# Patient Record
Sex: MALE | Race: OTHER | NOT HISPANIC OR LATINO | URBAN - METROPOLITAN AREA
[De-identification: names, ages, dates, MRNs, and addresses within clinical notes are randomized per-mention and may not be internally consistent; named-entity substitution may affect disease eponyms.]

---

## 2017-06-11 ENCOUNTER — EMERGENCY (EMERGENCY)
Facility: HOSPITAL | Age: 47
LOS: 1 days | Discharge: HOME | End: 2017-06-11
Attending: EMERGENCY MEDICINE | Admitting: EMERGENCY MEDICINE
Payer: MEDICAID

## 2017-06-11 VITALS
RESPIRATION RATE: 18 BRPM | HEIGHT: 66 IN | SYSTOLIC BLOOD PRESSURE: 108 MMHG | DIASTOLIC BLOOD PRESSURE: 69 MMHG | OXYGEN SATURATION: 98 % | HEART RATE: 120 BPM | WEIGHT: 199.96 LBS | TEMPERATURE: 99 F

## 2017-06-11 DIAGNOSIS — M25.522 PAIN IN LEFT ELBOW: ICD-10-CM

## 2017-06-11 DIAGNOSIS — S50.02XA CONTUSION OF LEFT ELBOW, INITIAL ENCOUNTER: ICD-10-CM

## 2017-06-11 DIAGNOSIS — I10 ESSENTIAL (PRIMARY) HYPERTENSION: ICD-10-CM

## 2017-06-11 DIAGNOSIS — F17.200 NICOTINE DEPENDENCE, UNSPECIFIED, UNCOMPLICATED: ICD-10-CM

## 2017-06-11 DIAGNOSIS — E11.9 TYPE 2 DIABETES MELLITUS WITHOUT COMPLICATIONS: ICD-10-CM

## 2017-06-11 PROCEDURE — 99283 EMERGENCY DEPT VISIT LOW MDM: CPT | Mod: 25

## 2017-06-11 RX ORDER — BACITRACIN ZINC 500 UNIT/G
1 OINTMENT IN PACKET (EA) TOPICAL ONCE
Qty: 0 | Refills: 0 | Status: COMPLETED | OUTPATIENT
Start: 2017-06-11 | End: 2017-06-11

## 2017-06-11 RX ORDER — TRAMADOL HYDROCHLORIDE 50 MG/1
25 TABLET ORAL ONCE
Qty: 0 | Refills: 0 | Status: DISCONTINUED | OUTPATIENT
Start: 2017-06-11 | End: 2017-06-11

## 2017-06-11 RX ADMIN — TRAMADOL HYDROCHLORIDE 25 MILLIGRAM(S): 50 TABLET ORAL at 06:33

## 2017-06-11 RX ADMIN — Medication 1 APPLICATION(S): at 06:33
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
## 2017-06-11 NOTE — ED PROVIDER NOTE - MUSCULOSKELETAL, MLM
Spine appears normal, L elbow with abrasion and minimal TTP, no ecchymosis, crepitus, deformity, laxity or edema, FROM, NV intact
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
## 2022-03-08 NOTE — ED ADULT TRIAGE NOTE - AS O2 DELIVERY
10-20-57    Patient has an appt today for labs, but there are no orders in.  Please review and enter the lab orders needed  
Lab ordered  
room air

## 2022-10-17 ENCOUNTER — INPATIENT (INPATIENT)
Facility: HOSPITAL | Age: 52
LOS: 0 days | Discharge: HOME | DRG: 440 | End: 2022-10-17
Attending: STUDENT IN AN ORGANIZED HEALTH CARE EDUCATION/TRAINING PROGRAM | Admitting: EMERGENCY MEDICINE
Payer: COMMERCIAL

## 2022-10-17 VITALS
TEMPERATURE: 99 F | HEART RATE: 73 BPM | OXYGEN SATURATION: 94 % | SYSTOLIC BLOOD PRESSURE: 160 MMHG | DIASTOLIC BLOOD PRESSURE: 94 MMHG | WEIGHT: 145.06 LBS | HEIGHT: 66 IN | RESPIRATION RATE: 15 BRPM

## 2022-10-17 VITALS — HEART RATE: 78 BPM

## 2022-10-17 PROBLEM — E11.9 TYPE 2 DIABETES MELLITUS WITHOUT COMPLICATIONS: Chronic | Status: ACTIVE | Noted: 2017-06-11

## 2022-10-17 PROBLEM — I10 ESSENTIAL (PRIMARY) HYPERTENSION: Chronic | Status: ACTIVE | Noted: 2017-06-11

## 2022-10-17 PROBLEM — J45.909 UNSPECIFIED ASTHMA, UNCOMPLICATED: Chronic | Status: ACTIVE | Noted: 2017-06-11

## 2022-10-17 LAB
ALBUMIN SERPL ELPH-MCNC: 3.8 G/DL — SIGNIFICANT CHANGE UP (ref 3.4–5)
ALP SERPL-CCNC: 139 U/L — HIGH (ref 40–120)
ALT FLD-CCNC: 92 U/L — HIGH (ref 12–42)
ANION GAP SERPL CALC-SCNC: 4 MMOL/L — LOW (ref 9–16)
APPEARANCE UR: CLEAR — SIGNIFICANT CHANGE UP
AST SERPL-CCNC: 51 U/L — HIGH (ref 15–37)
BASOPHILS # BLD AUTO: 0.04 K/UL — SIGNIFICANT CHANGE UP (ref 0–0.2)
BASOPHILS NFR BLD AUTO: 0.3 % — SIGNIFICANT CHANGE UP (ref 0–2)
BILIRUB DIRECT SERPL-MCNC: 1.3 MG/DL — HIGH (ref 0–0.3)
BILIRUB DIRECT SERPL-MCNC: SIGNIFICANT CHANGE UP MG/DL (ref 0–0.3)
BILIRUB SERPL-MCNC: 2.8 MG/DL — HIGH (ref 0.2–1.2)
BILIRUB UR-MCNC: NEGATIVE — SIGNIFICANT CHANGE UP
BUN SERPL-MCNC: 9 MG/DL — SIGNIFICANT CHANGE UP (ref 7–23)
CALCIUM SERPL-MCNC: 9.4 MG/DL — SIGNIFICANT CHANGE UP (ref 8.5–10.5)
CHLORIDE SERPL-SCNC: 101 MMOL/L — SIGNIFICANT CHANGE UP (ref 96–108)
CO2 SERPL-SCNC: 31 MMOL/L — SIGNIFICANT CHANGE UP (ref 22–31)
COLOR SPEC: YELLOW — SIGNIFICANT CHANGE UP
CREAT SERPL-MCNC: 0.87 MG/DL — SIGNIFICANT CHANGE UP (ref 0.5–1.3)
DIFF PNL FLD: NEGATIVE — SIGNIFICANT CHANGE UP
EGFR: 104 ML/MIN/1.73M2 — SIGNIFICANT CHANGE UP
EOSINOPHIL # BLD AUTO: 1.21 K/UL — HIGH (ref 0–0.5)
EOSINOPHIL NFR BLD AUTO: 8.1 % — HIGH (ref 0–6)
ETHANOL SERPL-MCNC: <3 MG/DL — SIGNIFICANT CHANGE UP
GLUCOSE SERPL-MCNC: 346 MG/DL — HIGH (ref 70–99)
GLUCOSE UR QL: >=1000
HCT VFR BLD CALC: 40.2 % — SIGNIFICANT CHANGE UP (ref 39–50)
HGB BLD-MCNC: 13.5 G/DL — SIGNIFICANT CHANGE UP (ref 13–17)
IMM GRANULOCYTES NFR BLD AUTO: 0.4 % — SIGNIFICANT CHANGE UP (ref 0–0.9)
KETONES UR-MCNC: NEGATIVE — SIGNIFICANT CHANGE UP
LEUKOCYTE ESTERASE UR-ACNC: NEGATIVE — SIGNIFICANT CHANGE UP
LIDOCAIN IGE QN: >1500 U/L — HIGH (ref 73–393)
LYMPHOCYTES # BLD AUTO: 14.5 % — SIGNIFICANT CHANGE UP (ref 13–44)
LYMPHOCYTES # BLD AUTO: 2.18 K/UL — SIGNIFICANT CHANGE UP (ref 1–3.3)
MAGNESIUM SERPL-MCNC: 1.7 MG/DL — SIGNIFICANT CHANGE UP (ref 1.6–2.6)
MCHC RBC-ENTMCNC: 31.8 PG — SIGNIFICANT CHANGE UP (ref 27–34)
MCHC RBC-ENTMCNC: 33.6 GM/DL — SIGNIFICANT CHANGE UP (ref 32–36)
MCV RBC AUTO: 94.8 FL — SIGNIFICANT CHANGE UP (ref 80–100)
MONOCYTES # BLD AUTO: 0.79 K/UL — SIGNIFICANT CHANGE UP (ref 0–0.9)
MONOCYTES NFR BLD AUTO: 5.3 % — SIGNIFICANT CHANGE UP (ref 2–14)
NEUTROPHILS # BLD AUTO: 10.71 K/UL — HIGH (ref 1.8–7.4)
NEUTROPHILS NFR BLD AUTO: 71.4 % — SIGNIFICANT CHANGE UP (ref 43–77)
NITRITE UR-MCNC: NEGATIVE — SIGNIFICANT CHANGE UP
NRBC # BLD: 0 /100 WBCS — SIGNIFICANT CHANGE UP (ref 0–0)
PCP SPEC-MCNC: SIGNIFICANT CHANGE UP
PH UR: 7 — SIGNIFICANT CHANGE UP (ref 5–8)
PLATELET # BLD AUTO: 336 K/UL — SIGNIFICANT CHANGE UP (ref 150–400)
POTASSIUM SERPL-MCNC: 4.1 MMOL/L — SIGNIFICANT CHANGE UP (ref 3.5–5.3)
POTASSIUM SERPL-SCNC: 4.1 MMOL/L — SIGNIFICANT CHANGE UP (ref 3.5–5.3)
PROT SERPL-MCNC: 8.4 G/DL — HIGH (ref 6.4–8.2)
PROT UR-MCNC: NEGATIVE MG/DL — SIGNIFICANT CHANGE UP
RBC # BLD: 4.24 M/UL — SIGNIFICANT CHANGE UP (ref 4.2–5.8)
RBC # FLD: 13.4 % — SIGNIFICANT CHANGE UP (ref 10.3–14.5)
SARS-COV-2 RNA SPEC QL NAA+PROBE: SIGNIFICANT CHANGE UP
SODIUM SERPL-SCNC: 136 MMOL/L — SIGNIFICANT CHANGE UP (ref 132–145)
SP GR SPEC: 1.01 — SIGNIFICANT CHANGE UP (ref 1–1.03)
UROBILINOGEN FLD QL: 0.2 E.U./DL — SIGNIFICANT CHANGE UP
WBC # BLD: 14.99 K/UL — HIGH (ref 3.8–10.5)
WBC # FLD AUTO: 14.99 K/UL — HIGH (ref 3.8–10.5)

## 2022-10-17 PROCEDURE — 81003 URINALYSIS AUTO W/O SCOPE: CPT

## 2022-10-17 PROCEDURE — 99223 1ST HOSP IP/OBS HIGH 75: CPT

## 2022-10-17 PROCEDURE — 99285 EMERGENCY DEPT VISIT HI MDM: CPT

## 2022-10-17 PROCEDURE — 36415 COLL VENOUS BLD VENIPUNCTURE: CPT

## 2022-10-17 PROCEDURE — 74177 CT ABD & PELVIS W/CONTRAST: CPT | Mod: 26

## 2022-10-17 PROCEDURE — 80053 COMPREHEN METABOLIC PANEL: CPT

## 2022-10-17 PROCEDURE — 83735 ASSAY OF MAGNESIUM: CPT

## 2022-10-17 PROCEDURE — 93010 ELECTROCARDIOGRAM REPORT: CPT

## 2022-10-17 PROCEDURE — 93005 ELECTROCARDIOGRAM TRACING: CPT

## 2022-10-17 PROCEDURE — 80307 DRUG TEST PRSMV CHEM ANLYZR: CPT

## 2022-10-17 PROCEDURE — 85025 COMPLETE CBC W/AUTO DIFF WBC: CPT

## 2022-10-17 PROCEDURE — 99285 EMERGENCY DEPT VISIT HI MDM: CPT | Mod: 25

## 2022-10-17 PROCEDURE — 74177 CT ABD & PELVIS W/CONTRAST: CPT

## 2022-10-17 PROCEDURE — 96374 THER/PROPH/DIAG INJ IV PUSH: CPT

## 2022-10-17 PROCEDURE — 96375 TX/PRO/DX INJ NEW DRUG ADDON: CPT

## 2022-10-17 PROCEDURE — 82248 BILIRUBIN DIRECT: CPT

## 2022-10-17 PROCEDURE — 87635 SARS-COV-2 COVID-19 AMP PRB: CPT

## 2022-10-17 PROCEDURE — 83690 ASSAY OF LIPASE: CPT

## 2022-10-17 RX ORDER — DEXTROSE 50 % IN WATER 50 %
25 SYRINGE (ML) INTRAVENOUS ONCE
Refills: 0 | Status: DISCONTINUED | OUTPATIENT
Start: 2022-10-17 | End: 2022-10-17

## 2022-10-17 RX ORDER — SODIUM CHLORIDE 9 MG/ML
1000 INJECTION INTRAMUSCULAR; INTRAVENOUS; SUBCUTANEOUS ONCE
Refills: 0 | Status: COMPLETED | OUTPATIENT
Start: 2022-10-17 | End: 2022-10-17

## 2022-10-17 RX ORDER — INSULIN LISPRO 100/ML
VIAL (ML) SUBCUTANEOUS
Refills: 0 | Status: DISCONTINUED | OUTPATIENT
Start: 2022-10-17 | End: 2022-10-17

## 2022-10-17 RX ORDER — ACETAMINOPHEN 500 MG
1000 TABLET ORAL ONCE
Refills: 0 | Status: COMPLETED | OUTPATIENT
Start: 2022-10-17 | End: 2022-10-17

## 2022-10-17 RX ORDER — DEXTROSE 50 % IN WATER 50 %
15 SYRINGE (ML) INTRAVENOUS ONCE
Refills: 0 | Status: DISCONTINUED | OUTPATIENT
Start: 2022-10-17 | End: 2022-10-17

## 2022-10-17 RX ORDER — METRONIDAZOLE 500 MG
500 TABLET ORAL EVERY 8 HOURS
Refills: 0 | Status: DISCONTINUED | OUTPATIENT
Start: 2022-10-17 | End: 2022-10-17

## 2022-10-17 RX ORDER — HYDROMORPHONE HYDROCHLORIDE 2 MG/ML
1 INJECTION INTRAMUSCULAR; INTRAVENOUS; SUBCUTANEOUS ONCE
Refills: 0 | Status: DISCONTINUED | OUTPATIENT
Start: 2022-10-17 | End: 2022-10-17

## 2022-10-17 RX ORDER — METRONIDAZOLE 500 MG
500 TABLET ORAL ONCE
Refills: 0 | Status: COMPLETED | OUTPATIENT
Start: 2022-10-17 | End: 2022-10-17

## 2022-10-17 RX ORDER — SODIUM CHLORIDE 9 MG/ML
1000 INJECTION, SOLUTION INTRAVENOUS
Refills: 0 | Status: DISCONTINUED | OUTPATIENT
Start: 2022-10-17 | End: 2022-10-17

## 2022-10-17 RX ORDER — ONDANSETRON 8 MG/1
4 TABLET, FILM COATED ORAL EVERY 6 HOURS
Refills: 0 | Status: DISCONTINUED | OUTPATIENT
Start: 2022-10-17 | End: 2022-10-17

## 2022-10-17 RX ORDER — MORPHINE SULFATE 50 MG/1
4 CAPSULE, EXTENDED RELEASE ORAL ONCE
Refills: 0 | Status: DISCONTINUED | OUTPATIENT
Start: 2022-10-17 | End: 2022-10-17

## 2022-10-17 RX ORDER — GLUCAGON INJECTION, SOLUTION 0.5 MG/.1ML
1 INJECTION, SOLUTION SUBCUTANEOUS ONCE
Refills: 0 | Status: DISCONTINUED | OUTPATIENT
Start: 2022-10-17 | End: 2022-10-17

## 2022-10-17 RX ORDER — DEXTROSE 50 % IN WATER 50 %
12.5 SYRINGE (ML) INTRAVENOUS ONCE
Refills: 0 | Status: DISCONTINUED | OUTPATIENT
Start: 2022-10-17 | End: 2022-10-17

## 2022-10-17 RX ORDER — CEFTRIAXONE 500 MG/1
1000 INJECTION, POWDER, FOR SOLUTION INTRAMUSCULAR; INTRAVENOUS EVERY 24 HOURS
Refills: 0 | Status: DISCONTINUED | OUTPATIENT
Start: 2022-10-17 | End: 2022-10-17

## 2022-10-17 RX ORDER — HEPARIN SODIUM 5000 [USP'U]/ML
5000 INJECTION INTRAVENOUS; SUBCUTANEOUS EVERY 8 HOURS
Refills: 0 | Status: DISCONTINUED | OUTPATIENT
Start: 2022-10-17 | End: 2022-10-17

## 2022-10-17 RX ORDER — IPRATROPIUM/ALBUTEROL SULFATE 18-103MCG
3 AEROSOL WITH ADAPTER (GRAM) INHALATION EVERY 6 HOURS
Refills: 0 | Status: DISCONTINUED | OUTPATIENT
Start: 2022-10-17 | End: 2022-10-17

## 2022-10-17 RX ORDER — PIPERACILLIN AND TAZOBACTAM 4; .5 G/20ML; G/20ML
3.38 INJECTION, POWDER, LYOPHILIZED, FOR SOLUTION INTRAVENOUS ONCE
Refills: 0 | Status: COMPLETED | OUTPATIENT
Start: 2022-10-17 | End: 2022-10-17

## 2022-10-17 RX ORDER — METOCLOPRAMIDE HCL 10 MG
10 TABLET ORAL ONCE
Refills: 0 | Status: COMPLETED | OUTPATIENT
Start: 2022-10-17 | End: 2022-10-17

## 2022-10-17 RX ORDER — HYDROMORPHONE HYDROCHLORIDE 2 MG/ML
0.5 INJECTION INTRAMUSCULAR; INTRAVENOUS; SUBCUTANEOUS EVERY 4 HOURS
Refills: 0 | Status: DISCONTINUED | OUTPATIENT
Start: 2022-10-17 | End: 2022-10-17

## 2022-10-17 RX ORDER — ONDANSETRON 8 MG/1
4 TABLET, FILM COATED ORAL ONCE
Refills: 0 | Status: COMPLETED | OUTPATIENT
Start: 2022-10-17 | End: 2022-10-17

## 2022-10-17 RX ORDER — METRONIDAZOLE 500 MG
TABLET ORAL
Refills: 0 | Status: DISCONTINUED | OUTPATIENT
Start: 2022-10-17 | End: 2022-10-17

## 2022-10-17 RX ADMIN — HYDROMORPHONE HYDROCHLORIDE 0.5 MILLIGRAM(S): 2 INJECTION INTRAMUSCULAR; INTRAVENOUS; SUBCUTANEOUS at 15:45

## 2022-10-17 RX ADMIN — Medication 1000 MILLIGRAM(S): at 12:00

## 2022-10-17 RX ADMIN — Medication 400 MILLIGRAM(S): at 11:24

## 2022-10-17 RX ADMIN — HYDROMORPHONE HYDROCHLORIDE 0.5 MILLIGRAM(S): 2 INJECTION INTRAMUSCULAR; INTRAVENOUS; SUBCUTANEOUS at 14:59

## 2022-10-17 RX ADMIN — Medication 100 MILLIGRAM(S): at 14:24

## 2022-10-17 RX ADMIN — SODIUM CHLORIDE 1000 MILLILITER(S): 9 INJECTION INTRAMUSCULAR; INTRAVENOUS; SUBCUTANEOUS at 03:57

## 2022-10-17 RX ADMIN — MORPHINE SULFATE 4 MILLIGRAM(S): 50 CAPSULE, EXTENDED RELEASE ORAL at 04:41

## 2022-10-17 RX ADMIN — CEFTRIAXONE 100 MILLIGRAM(S): 500 INJECTION, POWDER, FOR SOLUTION INTRAMUSCULAR; INTRAVENOUS at 12:46

## 2022-10-17 RX ADMIN — HYDROMORPHONE HYDROCHLORIDE 1 MILLIGRAM(S): 2 INJECTION INTRAMUSCULAR; INTRAVENOUS; SUBCUTANEOUS at 06:24

## 2022-10-17 RX ADMIN — Medication 10 MILLIGRAM(S): at 06:24

## 2022-10-17 RX ADMIN — SODIUM CHLORIDE 1000 MILLILITER(S): 9 INJECTION INTRAMUSCULAR; INTRAVENOUS; SUBCUTANEOUS at 06:59

## 2022-10-17 RX ADMIN — HYDROMORPHONE HYDROCHLORIDE 1 MILLIGRAM(S): 2 INJECTION INTRAMUSCULAR; INTRAVENOUS; SUBCUTANEOUS at 06:59

## 2022-10-17 RX ADMIN — SODIUM CHLORIDE 150 MILLILITER(S): 9 INJECTION, SOLUTION INTRAVENOUS at 07:25

## 2022-10-17 RX ADMIN — PIPERACILLIN AND TAZOBACTAM 3.38 GRAM(S): 4; .5 INJECTION, POWDER, LYOPHILIZED, FOR SOLUTION INTRAVENOUS at 07:00

## 2022-10-17 RX ADMIN — PIPERACILLIN AND TAZOBACTAM 200 GRAM(S): 4; .5 INJECTION, POWDER, LYOPHILIZED, FOR SOLUTION INTRAVENOUS at 06:24

## 2022-10-17 RX ADMIN — ONDANSETRON 4 MILLIGRAM(S): 8 TABLET, FILM COATED ORAL at 04:42

## 2022-10-17 RX ADMIN — MORPHINE SULFATE 4 MILLIGRAM(S): 50 CAPSULE, EXTENDED RELEASE ORAL at 07:37

## 2022-10-17 RX ADMIN — MORPHINE SULFATE 4 MILLIGRAM(S): 50 CAPSULE, EXTENDED RELEASE ORAL at 06:59

## 2022-10-17 RX ADMIN — HEPARIN SODIUM 5000 UNIT(S): 5000 INJECTION INTRAVENOUS; SUBCUTANEOUS at 14:59

## 2022-10-17 NOTE — ED PROVIDER NOTE - OBJECTIVE STATEMENT
53 yo M with PMHx of DM and asthma, no h/o intubation, and HTN, not vaccinated for COVID, BIBA for abdominal pain and N/V x 1 wk. Pt reports intermittent nausea and NBNB emesis x 1 wk with mid abd pain.  Pt has been seen at various hospitals and last seen at [facility], dx with pancreatitis and wanting to admit the patient. However, pt signed out AMA due to prolonged wait time and poorly controlled pain/sx. Noted persistent pain with associated 3 episodes of NBNB emesis today. Denies fever, chills, N/V/D/C, melena, hematochezia, hematuria, change in urinary/bowel function, dysuria, vaginal bleeding, d/c, flank pain, HA, dizziness, focal weakness, CP, SOB, palpitations, cough, and malaise. No h/o alcohol use, hypercholesterolemia

## 2022-10-17 NOTE — ED ADULT NURSE NOTE - OBJECTIVE STATEMENT
51 YO M here for abdominal pain, diffuse, 10/10, hx of pancreatitis, does not drink alcohol, does smoke cigarettes.  pt is doubled over in pain, also has nausea, per pt he has been treated for this for one year.   placed piv sent labs, blood tests, urine test and covid swab.  CT obtained, pt reports pain was not relieved by morphine, ordered for additional medication and will administer.

## 2022-10-17 NOTE — PROVIDER CONTACT NOTE (OTHER) - SITUATION
Pt refused Abd x-ray and wants medical documents sent from [facility] to [facility] to avoid repeat procedures. Pt stated he has had procedures performed before. Regiment pain regiment as well.
 after giving PRN Bp medication. Requesting Bp medication to manage. Pt claims his home medication for his BP is Lisinopril.
Advised pt is requesting Morphine for 10/10 pain. Pt states " im just going to leave and not tell anyone"
Pt refused for VS to be taken. Pt ripped off his Tele leads and refused LR. Pt states his pain is still 10/10 after Dilaudid was administered. Pt advised he will leave AMA.
Pt ripped out his IV and refused to sign AMA form. Pt stated he wants morphine and dose not want Dilaudid.

## 2022-10-17 NOTE — H&P ADULT - ASSESSMENT
53 yo M with PMHx Htn DM asthma presenting with mild acute on chronic pancreatitis, partially treated at [facility] previously before pt left AMA. CT noted for chronic pancreatitis with dilated main pancreatic duct and distal pancreatic duct calculus impacted at the level of ampulla associated with biliary dilatation. Patient states may intend to leave before tomorrow due to unwillingness to stay for extended course.   Elevated WBC with left shift, infection cannot be ruled out.   Patient denies drug or alcohol use, however, nurse noting possible drug seeking behavior based on pain med drug request as well as potential IV stick sites on R antecubital region. Will monitor pain, advance pain regiment as tolerated.     Plan  NPO/IVF (LR 150cc/hr)-adv as tolerated  Pain control PRN omifirev, hold off opioids for now  CBC/BMP q24  Monitor UO  CXR  Abx              53 yo M with PMHx Htn DM asthma presenting with mild acute on chronic pancreatitis, partially treated at [facility] previously before pt left AMA. CT noted for chronic pancreatitis with dilated main pancreatic duct and distal pancreatic duct calculus impacted at the level of ampulla associated with biliary dilatation. Patient states may intend to leave before tomorrow due to unwillingness to stay for extended course.   Elevated WBC with left shift, infection cannot be ruled out.  Patient denies drug or alcohol use, however, nurse noting possible drug seeking behavior based on pain med drug request as well as potential IV stick sites on bilateral antecubital region. Will monitor pain, advance pain regiment as tolerated.     Plan  NPO/IVF (LR 150cc/hr)-adv as tolerated  Pain control PRN omifirev, hold off opioids for now  CBC/BMP q24  Monitor UO  CXR  RUQ US, followed by MRCP  Abx regiment

## 2022-10-17 NOTE — H&P ADULT - ATTENDING COMMENTS
Patient is a 52 year old gentleman with history of HTN, DM, asthma who presents with concern for gallstone pancreatitis. He was previously evaluated at [facility], but left AMA. At time of evaluation, afebrile, hemodynamically stable, abdomen soft, RUQ tender to palpation, non distended, no peritonitis. Labs and imaging consistent with acute on chronic pancreatitis with calculus impacted at level of ampulla and associated biliary dilatation as well as dilated pancreatic duct with calcifications in pancreatic body. Patient denies drug or EtOH use.    Will plan to admit for bowel rest, IVF rehydration, serial abdominal exams.  Pain control, PRN anti-emetics  GI consult for evaluation and possible ERCP  Repeat labs, monitor I/O's  Will eventually require interval cholecystectomy

## 2022-10-17 NOTE — ED PROVIDER NOTE - CARE PLAN
1 Principal Discharge DX:	Gallstone pancreatitis  Secondary Diagnosis:	Cholelithiasis with choledocholithiasis
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
## 2022-10-17 NOTE — ED PROVIDER NOTE - ATTENDING APP SHARED VISIT CONTRIBUTION OF CARE
52-year-old man presenting with persistent abdominal pain.  He was diagnosed with pancreatitis at an outside hospital but left AMA due to prolonged wait and poorly controlled pain.  Vital signs are stable abdominal exam is consistent with pancreatitis.  Labs and CT also consistent with pancreatitis.  It is unclear from the CT whether he may have calcified gallstones or calcifications near the gallbladder.  Patient was admitted to [facility] under general surgery for further management.

## 2022-10-17 NOTE — H&P ADULT - HISTORY OF PRESENT ILLNESS
53 yo M with PMHx Htn DM asthma presenting from [facility] for abdominal pain and N/V x 1 wk. Patient reports <5 bouts NBNB emesis throughout week with abdominal pain. Patient presented to [place] one week prior for treatment of similar symptoms, however, patient left AMA complaints does not want to be bed-bound in hospital for extended period of time. Patient pain has persisted however, today his pain is better handled. Denies fever, chills, sob, positional pain, focal weakness, palpitations, or melena.       PAST MEDICAL & SURGICAL HISTORY:  DM (diabetes mellitus)      HTN (hypertension)      Asthma      No significant past surgical history        Allergies    No Known Allergies    Intolerances      MEDICATIONS  (STANDING):  lactated ringers. 1000 milliLiter(s) (150 mL/Hr) IV Continuous <Continuous>    MEDICATIONS  (PRN):        Physical Exam:   General: Well apperaing, resting comfortably in bed in no acute distress   Neuro: Grossly intact bilaterally   HEENT: Normocephalic, atraumatic, trachea midline, no JVD   Chest:   Heart: Regular S1/S2, no murmurs rubs or gallops   Lungs: Unlabored breathing on RA; Clear to auscultation bilaterally, no adventitious sounds   Abdomen: Soft, non-distended, normoactive bowel sounds throughout, RUQ tenderness to palpation, + murphys sign  Upper Extremities: No edema, freely mobile bilaterally. Multiple pot IV stick sites by L antecubital region  Lower Extremities: No edema, SCDs in place, feet warm bilaterally   Skin: Warm, non-diaphoretic throughout       Labs:                         13.5   14.99 )-----------( 336      ( 17 Oct 2022 03:08 )             40.2     10-    136  |  101  |  9   ----------------------------<  346<H>  4.1   |  31  |  0.87    Ca    9.4      17 Oct 2022 03:08  Mg     1.7     10-17    TPro  x   /  Alb  x   /  TBili  x   /  DBili  1.3<H>  /  AST  x   /  ALT  x   /  AlkPhos  x   10-17    CAPILLARY BLOOD GLUCOSE              COVID-19 PCR: NotDetec (17 Oct 2022 03:09)    Urinalysis Basic - ( 17 Oct 2022 04:43 )    Color: Yellow / Appearance: Clear / S.010 / pH: x  Gluc: x / Ketone: NEGATIVE  / Bili: NEGATIVE / Urobili: 0.2 E.U./dL   Blood: x / Protein: NEGATIVE mg/dL / Nitrite: NEGATIVE   Leuk Esterase: NEGATIVE / RBC: x / WBC x   Sq Epi: x / Non Sq Epi: x / Bacteria: x          Vital Signs:   Vital Signs Last 24 Hrs  T(C): 36.5 (17 Oct 2022 06:15), Max: 37.2 (17 Oct 2022 02:59)  T(F): 97.7 (17 Oct 2022 06:15), Max: 99 (17 Oct 2022 02:59)  HR: 100 (17 Oct 2022 07:35) (73 - 100)  BP: 165/88 (17 Oct 2022 07:35) (156/88 - 165/88)  BP(mean): --  RR: 16 (17 Oct 2022 07:35) (15 - 16)  SpO2: 98% (17 Oct 2022 07:35) (94% - 98%)    Parameters below as of 17 Oct 2022 07:35  Patient On (Oxygen Delivery Method): room air          Input/Output:   I&O's Detail    Daily Height in cm: 167.64 (17 Oct 2022 02:59)    Daily     Height (cm): 167.6 (10-17-22 @ 02:59)  Weight (kg): 65.8 (10-17-22 @ 02:59)  BMI (kg/m2): 23.4 (10-17-22 @ 02:59)  BSA (m2): 1.74 (10-17-22 @ 02:59)        < from: CT Abdomen and Pelvis w/ IV Cont (10.17.22 @ 05:17) >    ACC: [id number] EXAM:  CT ABDOMEN AND PELVIS IC                          PROCEDURE DATE:  10/17/2022          INTERPRETATION:  CT Abdomen And Pelvis With Contrast 10/17/2022 5:05 AM    Clinical indication: Mid abdominal and back pain, n/v    TECHNIQUE: Computed tomography of the abdomen and pelvis with contrast.    Intravenous contrast: Omnipaque 350. 97 mL injected. 3 mm discarded.    COMPARISON:  No relevant prior studies available.    FINDINGS:  Lungs: Dense alveolar consolidation in the rightmiddle lobe, partially   visualized. Mildly enlarged superior diaphragmatic lymph nodes.    Liver: No focal abnormality.  Gallbladder and bile ducts: Mildly distended gallbladder. Mild   gallbladder wall thickening. Mildly dilated intrahepatic bile ducts. CBD   mildly dilated measuring 1 cm.  Pancreas: Atrophic pancreas. Extensive pancreatic calcifications   consistent with chronic pancreatitis. Dilated main pancreatic duct   measuring up to 1.2 cm. 0.9 cm pancreatic duct calculus at the ampulla.  Spleen: Normal.  Adrenal glands: Normal. No mass.  Kidneys and ureters: Normal. No hydronephrosis.  Stomach and bowel: Stomach distended with fluid. No bowel wall thickening   or  intestinal obstruction.  Appendix: Prior appendectomy.    Intraperitoneal space: Trace ascites.  Vasculature: No abdominal aortic aneurysm. Patent portal vein.  Lymph nodes: Mild left retroperitoneal lymphadenopathy.  Urinary bladder: Mildly distended.  Reproductive: Unremarkable as visualized.  Bones/joints: Mild degenerative changes. No acute fracture.  Soft tissues: Unremarkable.    IMPRESSION:    Evidence of chronic pancreatitis with dilated main pancreatic duct and   distal pancreatic duct calculus impacted at the level of ampulla   associated with biliary dilatation.    Partial visualization of pulmonary consolidation in the right middle lobe   which could be due to pneumonia but neoplasm is not excluded. Recommend   CT of chest for complete evaluation.        --- End of Report ---            NEAL BRADY MD; Attending Radiologist  This document has been electronically signed. Oct 17 2022  9:02AM    < end of copied text >     53 yo M with PMHx Htn DM asthma presenting from [facility] for abdominal pain and N/V x 1 wk. Patient reports <5 bouts NBNB emesis throughout week with abdominal pain. Patient presented to [place] one week prior for treatment of similar symptoms, however, patient left AMA complaints does not want to be bed-bound in hospital for extended period of time. Patient pain has persisted however, today his pain is better handled. Denies fever, chills, sob, positional pain, focal weakness, palpitations, or melena.       PAST MEDICAL & SURGICAL HISTORY:  DM (diabetes mellitus)      HTN (hypertension)      Asthma      No significant past surgical history        Allergies    No Known Allergies    Intolerances      MEDICATIONS  (STANDING):  lactated ringers. 1000 milliLiter(s) (150 mL/Hr) IV Continuous <Continuous>    MEDICATIONS  (PRN):        Physical Exam:   General: Well apperaing, resting comfortably in bed in no acute distress   Neuro: Grossly intact bilaterally   HEENT: Normocephalic, atraumatic, trachea midline, no JVD   Chest:   Heart: Regular S1/S2, no murmurs rubs or gallops   Lungs: Unlabored breathing on RA; Clear to auscultation bilaterally, no adventitious sounds   Abdomen: Soft, non-distended, normoactive bowel sounds throughout, RUQ tenderness to palpation, + murphys sign  Upper Extremities: No edema, freely mobile bilaterally. Multiple pot IV stick sites by bilateral antecubital region  Lower Extremities: No edema, SCDs in place, feet warm bilaterally   Skin: Warm, non-diaphoretic throughout       Labs:                         13.5   14.99 )-----------( 336      ( 17 Oct 2022 03:08 )             40.2     10-    136  |  101  |  9   ----------------------------<  346<H>  4.1   |  31  |  0.87    Ca    9.4      17 Oct 2022 03:08  Mg     1.7     10-17    TPro  x   /  Alb  x   /  TBili  x   /  DBili  1.3<H>  /  AST  x   /  ALT  x   /  AlkPhos  x   10-17    CAPILLARY BLOOD GLUCOSE              COVID-19 PCR: NotDetec (17 Oct 2022 03:09)    Urinalysis Basic - ( 17 Oct 2022 04:43 )    Color: Yellow / Appearance: Clear / S.010 / pH: x  Gluc: x / Ketone: NEGATIVE  / Bili: NEGATIVE / Urobili: 0.2 E.U./dL   Blood: x / Protein: NEGATIVE mg/dL / Nitrite: NEGATIVE   Leuk Esterase: NEGATIVE / RBC: x / WBC x   Sq Epi: x / Non Sq Epi: x / Bacteria: x          Vital Signs:   Vital Signs Last 24 Hrs  T(C): 36.5 (17 Oct 2022 06:15), Max: 37.2 (17 Oct 2022 02:59)  T(F): 97.7 (17 Oct 2022 06:15), Max: 99 (17 Oct 2022 02:59)  HR: 100 (17 Oct 2022 07:35) (73 - 100)  BP: 165/88 (17 Oct 2022 07:35) (156/88 - 165/88)  BP(mean): --  RR: 16 (17 Oct 2022 07:35) (15 - 16)  SpO2: 98% (17 Oct 2022 07:35) (94% - 98%)    Parameters below as of 17 Oct 2022 07:35  Patient On (Oxygen Delivery Method): room air          Input/Output:   I&O's Detail    Daily Height in cm: 167.64 (17 Oct 2022 02:59)    Daily     Height (cm): 167.6 (10-17-22 @ 02:59)  Weight (kg): 65.8 (10-17-22 @ 02:59)  BMI (kg/m2): 23.4 (10-17-22 @ 02:59)  BSA (m2): 1.74 (10-17-22 @ 02:59)        < from: CT Abdomen and Pelvis w/ IV Cont (10.17.22 @ 05:17) >    ACC: [id number] EXAM:  CT ABDOMEN AND PELVIS IC                          PROCEDURE DATE:  10/17/2022          INTERPRETATION:  CT Abdomen And Pelvis With Contrast 10/17/2022 5:05 AM    Clinical indication: Mid abdominal and back pain, n/v    TECHNIQUE: Computed tomography of the abdomen and pelvis with contrast.    Intravenous contrast: Omnipaque 350. 97 mL injected. 3 mm discarded.    COMPARISON:  No relevant prior studies available.    FINDINGS:  Lungs: Dense alveolar consolidation in the rightmiddle lobe, partially   visualized. Mildly enlarged superior diaphragmatic lymph nodes.    Liver: No focal abnormality.  Gallbladder and bile ducts: Mildly distended gallbladder. Mild   gallbladder wall thickening. Mildly dilated intrahepatic bile ducts. CBD   mildly dilated measuring 1 cm.  Pancreas: Atrophic pancreas. Extensive pancreatic calcifications   consistent with chronic pancreatitis. Dilated main pancreatic duct   measuring up to 1.2 cm. 0.9 cm pancreatic duct calculus at the ampulla.  Spleen: Normal.  Adrenal glands: Normal. No mass.  Kidneys and ureters: Normal. No hydronephrosis.  Stomach and bowel: Stomach distended with fluid. No bowel wall thickening   or  intestinal obstruction.  Appendix: Prior appendectomy.    Intraperitoneal space: Trace ascites.  Vasculature: No abdominal aortic aneurysm. Patent portal vein.  Lymph nodes: Mild left retroperitoneal lymphadenopathy.  Urinary bladder: Mildly distended.  Reproductive: Unremarkable as visualized.  Bones/joints: Mild degenerative changes. No acute fracture.  Soft tissues: Unremarkable.    IMPRESSION:    Evidence of chronic pancreatitis with dilated main pancreatic duct and   distal pancreatic duct calculus impacted at the level of ampulla   associated with biliary dilatation.    Partial visualization of pulmonary consolidation in the right middle lobe   which could be due to pneumonia but neoplasm is not excluded. Recommend   CT of chest for complete evaluation.        --- End of Report ---            NEAL BRADY MD; Attending Radiologist  This document has been electronically signed. Oct 17 2022  9:02AM    < end of copied text >

## 2022-10-17 NOTE — ED PROVIDER NOTE - CLINICAL SUMMARY MEDICAL DECISION MAKING FREE TEXT BOX
Patient presents with intermittent abdominal pain, nausea vomiting x1 week.  Has been seen at various hospitals, last seen the [place] was, recommended hospitalization for acute pancreatitis but patient signed out AMA.  Returns to the ER today for persistent pain, nausea vomiting, inability to tolerate p.o.  Afebrile, vital signs stable, abdomen soft but tender to palpation upper quadrants.  Labs significant for elevated white count 15, elevated LFTs, lipase >1500, CT reveals acute cholecystitis with gallstone induced pancreatitis, and choledocholithiasis.  Patient has been kept n.p.o. in the ED, status post IV fluids, Zosyn, pain adequately controlled.  Case discussed with ACS, Dr. Hatch, excepted to surgical telemetry bed for further intervention and management.

## 2022-10-17 NOTE — ED ADULT NURSE NOTE - CHIEF COMPLAINT QUOTE
Pt brought in by EMS for stating "I suffer from the pancreas." Pt states he has been in and out of [facility] ED and left against medical advice yesterday after he got admitted.

## 2022-10-17 NOTE — ED PROVIDER NOTE - NS ED ATTENDING STATEMENT MOD
This was a shared visit with the [facility]. I reviewed and verified the documentation and independently performed the documented:

## 2022-10-17 NOTE — ED ADULT TRIAGE NOTE - CHIEF COMPLAINT QUOTE
Pt brought in by EMS for stating "I suffer from the pancreas." Pt states he has been in and out of [facility] ED and left against medical advice yesterday after he got admitted.

## 2022-10-17 NOTE — PROVIDER CONTACT NOTE (OTHER) - REASON
Pt refused for VS to be taken. Pt ripped off his Tele leads and refused LR.
Advised pt is requesting Morphine for 10/10 pain.
 after giving PRN Bp medication. Requesting Bp medication to manage.
Pt ripped out his IV and refused to sign AMA form
Pt refused Abd x-ray and wants medical documents sent from [facility] to [facility] to avoid repeat procedures.

## 2022-10-17 NOTE — H&P ADULT - TIME BILLING
evaluation and management of acute on chronic pancreatitis, gallstone disease, discussion with patient regarding natural progression of disease, role for endoscopic intervention and interval surgical intervention, benefits and risks associated with interventions

## 2022-10-17 NOTE — PROVIDER CONTACT NOTE (OTHER) - RECOMMENDATIONS
For team member to come to bedside to speak with the patient about his pain medication options. to d/c Tele monitoring and LR order and pt is refusing both .
For the team to come to bedside to speak with the patient about his options. Also to place a pain regiment for the patient. Q4 or Q6
To d/c tele order and order morphine for the patient.
n/a
order home medications for patient and order IVP Labetalol to manage current SBP of 170

## 2022-10-17 NOTE — ED PROVIDER NOTE - PHYSICAL EXAMINATION
Vital Signs - nursing notes reviewed and confirmed  Gen - WDWN M, uncomfortable appearing 2/2 pain, non-toxic appearing, speaking in full sentences   Skin - warm, dry, intact  HEENT - AT/NC, PERRL, sclera clear, dry oral mucosa, TM intact b/l with good cone of lights, o/p clear with no erythema, edema, or exudate, uvula midline, airway patent, neck supple and NT, FROM,  CV - S1S2, R/R/R  Resp - respiration non-labored, CTAB, symmetric bs b/l, no r/r/w  GI - NABS, soft, ND, mid abd region TTP, no rebound or guarding, +mid back TTP, no CVAT b/l   MS - w/w/p, no c/c/e, calves supple and NT, distal pulses symmetric b/l, brisk cap refills, +SILT, NV intact, FROM, compartment soft  Neuro - AxOx3, no focal neuro deficits, CN II-XII grossly intact, fluent speech, ambulatory without gait disturbance  Psych - Cooperative, appropriate mood, language/behaviors Vital Signs - nursing notes reviewed and confirmed  Gen - WDWN M, uncomfortable appearing 2/2 pain, non-toxic appearing, speaking in full sentences   Skin - warm, dry, no acute rash   HEENT - AT/NC, PERRL, sclera clear, dry oral mucosa, TM intact b/l with good cone of lights, o/p clear with no erythema, edema, or exudate, uvula midline, airway patent, neck supple and NT, FROM,  CV - S1S2, R/R/R  Resp - respiration non-labored, CTAB, symmetric bs b/l, no r/r/w  GI - NABS, soft, ND, RUQ and mid abd region TTP, +bowles's, no rebound, +mid back TTP, no CVAT b/l   MS - w/w/p, no c/c/e, calves supple and NT, distal pulses symmetric b/l, brisk cap refills, +SILT, NV intact, FROM, compartment soft  Neuro - AxOx3, no focal neuro deficits, CN II-XII grossly intact, fluent speech, ambulatory without gait disturbance  Psych - Cooperative, appropriate mood, language/behaviors

## 2022-10-17 NOTE — CHART NOTE - NSCHARTNOTEFT_GEN_A_CORE
Patient refusing IV fluids and telemonitoring. Discussed with patient the risks of refusing treatment and importance of telemonitoring. Patient continues to refuse despite demonstrating understanding of clinical risks.

## 2022-10-17 NOTE — PROVIDER CONTACT NOTE (OTHER) - ACTION/TREATMENT ORDERED:
MD Hall advised he will speak to the patient at bedside and place a Q4 regiment in.
Dany Marinelli notified.
Order will be place for Labetalol as per ENT team
JOSS Guadarrama V came to bedside and said she would contact her resident to decide on d/cing the orders
JOSS Healy advised to document interaction with patient and that she would ask if the patient can received morphine instead Dilaudid.

## 2022-10-17 NOTE — H&P ADULT - NSHPPHYSICALEXAM_GEN_ALL_CORE
Physical Exam:   General: Well apperaing, resting comfortably in bed in no acute distress   Neuro: Grossly intact bilaterally   HEENT: Normocephalic, atraumatic, trachea midline, no JVD   Chest:   Heart: Regular S1/S2, no murmurs rubs or gallops   Lungs: Unlabored breathing on RA; Clear to auscultation bilaterally, no adventitious sounds   Abdomen: Soft, non-distended, normoactive bowel sounds throughout, RUQ tenderness to palpation, + murphys sign  Upper Extremities: No edema, freely mobile bilaterally. Multiple pot IV stick sites by bilateral antecubital region  Lower Extremities: No edema, SCDs in place, feet warm bilaterally   Skin: Warm, non-diaphoretic throughout

## 2022-10-17 NOTE — PROVIDER CONTACT NOTE (OTHER) - ASSESSMENT
Pt was resting comfortably in bed. No complaints of chest pain, heart palpitations, dizziness, or SOB.
Pt body language displayed he was calm but defensive. Pt refused for VS to be taken. Pt used profanity to describe how he "hates this hospital and I don't know why they just don't give me my pain meds when I  tell them."
Pt was resting comfortably in bed. VSS. No signs of acute pain seen. Pt stated he is always in 10/10 pain .
Pt was sitting up in bed and complained of 10/10 abd pain.
Pt was ambulating in the hallway. no visual signs of pain or distress noted.

## 2022-10-20 DIAGNOSIS — K85.10 BILIARY ACUTE PANCREATITIS WITHOUT NECROSIS OR INFECTION: ICD-10-CM

## 2022-10-20 DIAGNOSIS — E11.9 TYPE 2 DIABETES MELLITUS WITHOUT COMPLICATIONS: ICD-10-CM

## 2022-10-20 DIAGNOSIS — J45.909 UNSPECIFIED ASTHMA, UNCOMPLICATED: ICD-10-CM

## 2022-10-20 DIAGNOSIS — I10 ESSENTIAL (PRIMARY) HYPERTENSION: ICD-10-CM
